# Patient Record
Sex: FEMALE | Race: ASIAN | NOT HISPANIC OR LATINO | URBAN - METROPOLITAN AREA
[De-identification: names, ages, dates, MRNs, and addresses within clinical notes are randomized per-mention and may not be internally consistent; named-entity substitution may affect disease eponyms.]

---

## 2017-11-05 ENCOUNTER — EMERGENCY (EMERGENCY)
Facility: HOSPITAL | Age: 77
LOS: 1 days | Discharge: HOME | End: 2017-11-05
Attending: EMERGENCY MEDICINE | Admitting: EMERGENCY MEDICINE
Payer: COMMERCIAL

## 2017-11-05 VITALS — SYSTOLIC BLOOD PRESSURE: 196 MMHG | DIASTOLIC BLOOD PRESSURE: 83 MMHG | OXYGEN SATURATION: 97 % | HEART RATE: 65 BPM

## 2017-11-05 VITALS
SYSTOLIC BLOOD PRESSURE: 161 MMHG | RESPIRATION RATE: 20 BRPM | HEART RATE: 65 BPM | DIASTOLIC BLOOD PRESSURE: 74 MMHG | OXYGEN SATURATION: 98 %

## 2017-11-05 PROCEDURE — 99285 EMERGENCY DEPT VISIT HI MDM: CPT

## 2017-11-05 PROCEDURE — 72125 CT NECK SPINE W/O DYE: CPT | Mod: 26

## 2017-11-05 PROCEDURE — 70486 CT MAXILLOFACIAL W/O DYE: CPT

## 2017-11-05 PROCEDURE — 72125 CT NECK SPINE W/O DYE: CPT

## 2017-11-05 PROCEDURE — 99284 EMERGENCY DEPT VISIT MOD MDM: CPT | Mod: 25

## 2017-11-05 PROCEDURE — 70450 CT HEAD/BRAIN W/O DYE: CPT

## 2017-11-05 PROCEDURE — 70450 CT HEAD/BRAIN W/O DYE: CPT | Mod: 26

## 2017-11-05 PROCEDURE — 70486 CT MAXILLOFACIAL W/O DYE: CPT | Mod: 26

## 2017-11-05 RX ORDER — ACETAMINOPHEN 500 MG
975 TABLET ORAL ONCE
Qty: 0 | Refills: 0 | Status: COMPLETED | OUTPATIENT
Start: 2017-11-05 | End: 2017-11-05

## 2017-11-05 RX ADMIN — Medication 975 MILLIGRAM(S): at 21:30
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
## 2017-11-05 NOTE — ED PROVIDER NOTE - PHYSICAL EXAMINATION
General: well appearing female in no acute distress  HEENT: left sided hematoma, EOMI, midline cervical spine tenderness   Respiratory: normal work of breathing, lungs clear to auscultation bilaterally   Cardiac: regular rate and rhythm, no tenderness to palpation   Abdomen: soft, non-tender  MSK: no swelling or tenderness of lower extremities   Neuro: A&Ox3
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
## 2017-11-05 NOTE — ED PROVIDER NOTE - PROGRESS NOTE DETAILS
ATTENDING MD Rizwan: CT w/out fracture. Pt without midline tenderness but unable to range neck will DC with collar .CT ?s possible foreign body on left face but I noticed and discussed with radiology that there is a similar but smaller object on right side on the same level but no right sided face trauma (seen series 4 image 74 and coronal series 601 image 25). Upon rediscussion with radiology it is possible these are intrinstic calfications as they are symmetrical and appear to be at the same level. There is no overlying skin laceration and radiology does not think it is possible for a foreign body to have tracked this far through her eyelid. We will refer pt to opthalmology for follow up but do no tthink that local exploration is indicated at this time. Pt stable for DC with C-collar. Referrals to spine clinic for C-collar, opthalmology, and to PCP. I answered all questions to the best of my ability. I have advised the patient on the usual course of this illness, an appropriate schedule for follow-up, and concerning signs and symptoms that should prompt return to the emergency department. The patient is stable for discharge.
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
## 2017-11-11 ENCOUNTER — EMERGENCY (EMERGENCY)
Facility: HOSPITAL | Age: 77
LOS: 1 days | Discharge: HOME | End: 2017-11-11
Attending: EMERGENCY MEDICINE | Admitting: EMERGENCY MEDICINE
Payer: COMMERCIAL

## 2017-11-11 VITALS
DIASTOLIC BLOOD PRESSURE: 80 MMHG | RESPIRATION RATE: 20 BRPM | TEMPERATURE: 98 F | SYSTOLIC BLOOD PRESSURE: 191 MMHG | WEIGHT: 117.95 LBS | HEART RATE: 74 BPM | OXYGEN SATURATION: 97 %

## 2017-11-11 VITALS
DIASTOLIC BLOOD PRESSURE: 80 MMHG | TEMPERATURE: 98 F | SYSTOLIC BLOOD PRESSURE: 177 MMHG | OXYGEN SATURATION: 99 % | HEART RATE: 60 BPM | RESPIRATION RATE: 17 BRPM

## 2017-11-11 LAB
ALBUMIN SERPL ELPH-MCNC: 4.2 G/DL — SIGNIFICANT CHANGE UP (ref 3.3–5)
ALP SERPL-CCNC: 63 U/L — SIGNIFICANT CHANGE UP (ref 40–120)
ALT FLD-CCNC: 20 U/L RC — SIGNIFICANT CHANGE UP (ref 10–45)
ANION GAP SERPL CALC-SCNC: 8 MMOL/L — SIGNIFICANT CHANGE UP (ref 5–17)
APTT BLD: 66.4 SEC — HIGH (ref 27.5–37.4)
AST SERPL-CCNC: 23 U/L — SIGNIFICANT CHANGE UP (ref 10–40)
BASOPHILS # BLD AUTO: 0.1 K/UL — SIGNIFICANT CHANGE UP (ref 0–0.2)
BASOPHILS NFR BLD AUTO: 0.6 % — SIGNIFICANT CHANGE UP (ref 0–2)
BILIRUB SERPL-MCNC: 0.7 MG/DL — SIGNIFICANT CHANGE UP (ref 0.2–1.2)
BUN SERPL-MCNC: 11 MG/DL — SIGNIFICANT CHANGE UP (ref 7–23)
CALCIUM SERPL-MCNC: 9.2 MG/DL — SIGNIFICANT CHANGE UP (ref 8.4–10.5)
CHLORIDE SERPL-SCNC: 101 MMOL/L — SIGNIFICANT CHANGE UP (ref 96–108)
CO2 SERPL-SCNC: 30 MMOL/L — SIGNIFICANT CHANGE UP (ref 22–31)
CREAT SERPL-MCNC: 0.67 MG/DL — SIGNIFICANT CHANGE UP (ref 0.5–1.3)
EOSINOPHIL # BLD AUTO: 0.1 K/UL — SIGNIFICANT CHANGE UP (ref 0–0.5)
EOSINOPHIL NFR BLD AUTO: 1.1 % — SIGNIFICANT CHANGE UP (ref 0–6)
GLUCOSE SERPL-MCNC: 111 MG/DL — HIGH (ref 70–99)
HCT VFR BLD CALC: 42.2 % — SIGNIFICANT CHANGE UP (ref 34.5–45)
HGB BLD-MCNC: 14.2 G/DL — SIGNIFICANT CHANGE UP (ref 11.5–15.5)
INR BLD: 1 RATIO — SIGNIFICANT CHANGE UP (ref 0.88–1.16)
LYMPHOCYTES # BLD AUTO: 1.7 K/UL — SIGNIFICANT CHANGE UP (ref 1–3.3)
LYMPHOCYTES # BLD AUTO: 18 % — SIGNIFICANT CHANGE UP (ref 13–44)
MCHC RBC-ENTMCNC: 31.3 PG — SIGNIFICANT CHANGE UP (ref 27–34)
MCHC RBC-ENTMCNC: 33.6 GM/DL — SIGNIFICANT CHANGE UP (ref 32–36)
MCV RBC AUTO: 93.1 FL — SIGNIFICANT CHANGE UP (ref 80–100)
MONOCYTES # BLD AUTO: 0.5 K/UL — SIGNIFICANT CHANGE UP (ref 0–0.9)
MONOCYTES NFR BLD AUTO: 5.4 % — SIGNIFICANT CHANGE UP (ref 2–14)
NEUTROPHILS # BLD AUTO: 7.1 K/UL — SIGNIFICANT CHANGE UP (ref 1.8–7.4)
NEUTROPHILS NFR BLD AUTO: 74.9 % — SIGNIFICANT CHANGE UP (ref 43–77)
PLATELET # BLD AUTO: 301 K/UL — SIGNIFICANT CHANGE UP (ref 150–400)
POTASSIUM SERPL-MCNC: 4.6 MMOL/L — SIGNIFICANT CHANGE UP (ref 3.5–5.3)
POTASSIUM SERPL-SCNC: 4.6 MMOL/L — SIGNIFICANT CHANGE UP (ref 3.5–5.3)
PROT SERPL-MCNC: 8.1 G/DL — SIGNIFICANT CHANGE UP (ref 6–8.3)
PROTHROM AB SERPL-ACNC: 10.8 SEC — SIGNIFICANT CHANGE UP (ref 9.8–12.7)
RBC # BLD: 4.53 M/UL — SIGNIFICANT CHANGE UP (ref 3.8–5.2)
RBC # FLD: 13.5 % — SIGNIFICANT CHANGE UP (ref 10.3–14.5)
SODIUM SERPL-SCNC: 139 MMOL/L — SIGNIFICANT CHANGE UP (ref 135–145)
WBC # BLD: 9.4 K/UL — SIGNIFICANT CHANGE UP (ref 3.8–10.5)
WBC # FLD AUTO: 9.4 K/UL — SIGNIFICANT CHANGE UP (ref 3.8–10.5)

## 2017-11-11 PROCEDURE — 93010 ELECTROCARDIOGRAM REPORT: CPT

## 2017-11-11 PROCEDURE — 85610 PROTHROMBIN TIME: CPT

## 2017-11-11 PROCEDURE — 70450 CT HEAD/BRAIN W/O DYE: CPT | Mod: 26

## 2017-11-11 PROCEDURE — 85730 THROMBOPLASTIN TIME PARTIAL: CPT

## 2017-11-11 PROCEDURE — 70450 CT HEAD/BRAIN W/O DYE: CPT

## 2017-11-11 PROCEDURE — 93005 ELECTROCARDIOGRAM TRACING: CPT

## 2017-11-11 PROCEDURE — 85027 COMPLETE CBC AUTOMATED: CPT

## 2017-11-11 PROCEDURE — 80053 COMPREHEN METABOLIC PANEL: CPT

## 2017-11-11 PROCEDURE — 99285 EMERGENCY DEPT VISIT HI MDM: CPT | Mod: 25

## 2017-11-11 PROCEDURE — 99284 EMERGENCY DEPT VISIT MOD MDM: CPT | Mod: 25

## 2017-11-11 RX ORDER — TRAMADOL HYDROCHLORIDE 50 MG/1
50 TABLET ORAL ONCE
Qty: 0 | Refills: 0 | Status: DISCONTINUED | OUTPATIENT
Start: 2017-11-11 | End: 2017-11-11

## 2017-11-11 RX ORDER — SODIUM CHLORIDE 9 MG/ML
500 INJECTION INTRAMUSCULAR; INTRAVENOUS; SUBCUTANEOUS ONCE
Qty: 0 | Refills: 0 | Status: COMPLETED | OUTPATIENT
Start: 2017-11-11 | End: 2017-11-11

## 2017-11-11 RX ORDER — ONDANSETRON 8 MG/1
4 TABLET, FILM COATED ORAL ONCE
Qty: 0 | Refills: 0 | Status: COMPLETED | OUTPATIENT
Start: 2017-11-11 | End: 2017-11-11

## 2017-11-11 RX ORDER — ACETAMINOPHEN 500 MG
0 TABLET ORAL
Qty: 0 | Refills: 0 | COMMUNITY

## 2017-11-11 RX ORDER — TRAMADOL HYDROCHLORIDE 50 MG/1
1 TABLET ORAL
Qty: 30 | Refills: 0 | OUTPATIENT
Start: 2017-11-11 | End: 2017-11-16

## 2017-11-11 RX ORDER — OLMESARTAN MEDOXOMIL 5 MG/1
0 TABLET, FILM COATED ORAL
Qty: 0 | Refills: 0 | COMMUNITY

## 2017-11-11 RX ORDER — ONDANSETRON 8 MG/1
1 TABLET, FILM COATED ORAL
Qty: 20 | Refills: 0 | OUTPATIENT
Start: 2017-11-11 | End: 2017-11-18

## 2017-11-11 RX ADMIN — TRAMADOL HYDROCHLORIDE 50 MILLIGRAM(S): 50 TABLET ORAL at 15:23

## 2017-11-11 RX ADMIN — TRAMADOL HYDROCHLORIDE 50 MILLIGRAM(S): 50 TABLET ORAL at 14:32

## 2017-11-11 RX ADMIN — SODIUM CHLORIDE 1500 MILLILITER(S): 9 INJECTION INTRAMUSCULAR; INTRAVENOUS; SUBCUTANEOUS at 14:32

## 2017-11-11 NOTE — ED ADULT NURSE NOTE - CAS TRG GENERAL NORM CIRC DET
Capillary refill less/equal to 2 seconds/No visible significant external bleeding/Strong peripheral pulses

## 2017-11-11 NOTE — ED PROVIDER NOTE - PROGRESS NOTE DETAILS
Resident: CT negative for hemorrhage. Patient feels better and wants to go home. Will dc with tramadol, zofran.
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
## 2017-11-11 NOTE — ED PROVIDER NOTE - CRANIAL NERVE AND PUPILLARY EXAM
cranial nerves 2-12 intact/central and peripheral vision intact/cough reflex intact/tongue is midline

## 2017-11-11 NOTE — ED PROVIDER NOTE - NS ED ROS FT
Constitutional: no fever, no chills.  Eyes: +visual changes.  ENMT: no sore throat.  CV: no chest pain.  Resp: no cough, no shortness of breath.  GI: no abdominal pain, +nausea, +vomiting, no diarrhea.  : no dysuria, no hematuria.  MSK: +back pain, +neck pain.  Skin: no rashes.  Neuro: +headache, no weakness, +numbness, +tingling.  Psych: no known mental health issues.  Endo: no diabetes, no thyroid trouble.

## 2017-11-11 NOTE — ED ADULT NURSE REASSESSMENT NOTE - NS ED NURSE REASSESS COMMENT FT1
Pt is tolerating PO intake well - denies N/V. Pt states she feels better and is ready to go home. Family at bedside.
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
## 2020-12-03 NOTE — ED ADULT NURSE NOTE - DURATION
HISTORY    The patient is a 82year old female who comes in complaining of a loss of appetite since the day after her vocal cord injection. She has some nausea but no vomiting. No abdominal pain. She took magnesium and it gave her diarrhea. She then took Kaopectate which helped. It made her stool dark. Pepto helped but also made her stool dark. No abdominal pain. She's lost 5 lb unintentionally since her last visit with me. She feels fine except she feels out of breath. She's having an echo tomorrow. No chest pain or pressure. She'd like a refill of Xanax which she uses sparingly. ALLERGIES:   Allergen Reactions   â¢ Cephalexin HIVES   â¢ Kiwi SWELLING     Throat swelling   â¢ Lisinopril SWELLING     Facial swelling     â¢ Oxycodone      Patient states causes hallucinations   â¢ Papaya SWELLING     Throat swelling   â¢ Zithromax [Azithromycin] Other (See Comments)     Hearing loss, left ear       Outpatient Medications Marked as Taking for the 12/3/20 encounter (Office Visit) with Soren Madera MD   Medication Sig Dispense Refill   â¢ amLODIPine (NORVASC) 5 MG tablet Take 2 tablets by mouth daily. 90 tablet 2   â¢ gabapentin (NEURONTIN) 300 MG capsule TAKE 1 CAPSULE BY MOUTH TWICE DAILY 60 capsule 2   â¢ carvedilol (COREG) 25 MG tablet Take 1 tablet by mouth 2 times daily (with meals). 180 tablet 0   â¢ furosemide (LASIX) 20 MG tablet TAKE 1 TABLET EVERY DAY (NEED MD APPOINTMENT) 90 tablet 0   â¢ triamcinolone (ARISTOCORT) 0.1 % cream Apply sparingly to affected area twice daily as needed. Maximum use of 2 weeks per flare-up. 15 g 1   â¢ estradiol 0.2 mg/g (0.02 %) cream (in natural base) Apply 1 GM vaginally three times weekly 30 g 11   â¢ warfarin (COUMADIN) 5 MG tablet Take 1.5 tabs MWF 2 tabs other days. 180 tablet 2   â¢ VITAMIN D, CHOLECALCIFEROL, PO Take 2 tablets by mouth daily.  Reports taking one tablet daily by mouth     â¢ ALPRAZolam (XANAX) 0.25 MG tablet Take 1/2-1 tablet by mouth twice daily as needed for "anxiety. 60 tablet 2   â¢ amoxicillin (AMOXIL) 500 MG tablet Take 4 tablets 1 hour before procedure. (Patient taking differently: Take 4 tablets 1 hour before procedure dental) 8 tablet 0   â¢ acetaminophen (TYLENOL) 325 MG tablet Take 650 mg by mouth every 4 hours as needed for Pain. â¢ acetaminophen-codeine (TYLENOL NO.3) 300-30 MG per tablet Take 1-2 tablets by mouth 2 times daily as needed for Pain. 15 tablet 0         Tobacco Use: Never           REVIEW OF SYSTEMS:  All Review of Systems negative except as above in the history of present illness. PHYSICAL EXAM  Vitals:  Blood pressure 138/72, pulse 88, temperature 97.3 Â°F (36.3 Â°C), temperature source Temporal, resp. rate 16, height 5' 5.5"" (1.664 m), weight 67 kg. Wt Readings from Last 3 Encounters:   12/03/20 67 kg   11/13/20 68.9 kg   11/11/20 69 kg     General:  Well-nourished, well-developed, in no acute distress. Abdomen:  Normal bowel sounds, soft, nontender, no masses, no hepatosplenomegaly. Skin:  Warm and dry; no rashes. ASSESSMENT/PLAN    Karie Laboy was seen today for loss of appetite. Diagnoses and all orders for this visit:    Nausea  -     ondansetron (Zofran) 4 MG tablet; Take 1 tablet by mouth every 8 hours as needed for Nausea. Clear liquid diet for 24 hours, advance as tolerated. She declines a KUB at this time. Anxiety  -     ALPRAZolam (XANAX) 0.25 MG tablet; Take 1/2-1 tablet by mouth twice daily as needed for anxiety.      Judy Trinh MD  " today
